# Patient Record
Sex: FEMALE | Race: WHITE | NOT HISPANIC OR LATINO | URBAN - METROPOLITAN AREA
[De-identification: names, ages, dates, MRNs, and addresses within clinical notes are randomized per-mention and may not be internally consistent; named-entity substitution may affect disease eponyms.]

---

## 2023-03-10 ENCOUNTER — APPOINTMENT (RX ONLY)
Dept: URBAN - METROPOLITAN AREA CLINIC 52 | Facility: CLINIC | Age: 58
Setting detail: DERMATOLOGY
End: 2023-03-10

## 2023-03-10 DIAGNOSIS — L71.8 OTHER ROSACEA: ICD-10-CM | Status: WORSENING

## 2023-03-10 PROCEDURE — ? PRESCRIPTION MEDICATION MANAGEMENT

## 2023-03-10 PROCEDURE — ? COUNSELING

## 2023-03-10 PROCEDURE — 99204 OFFICE O/P NEW MOD 45 MIN: CPT

## 2023-03-10 PROCEDURE — ? PRESCRIPTION

## 2023-03-10 RX ORDER — PHARMACY COMPOUNDING ACCESSORY
EACH MISCELLANEOUS
Qty: 30 | Refills: 5 | Status: ERX | COMMUNITY
Start: 2023-03-10

## 2023-03-10 RX ORDER — DOXYCYCLINE HYCLATE 100 MG/1
1 TABLET, COATED ORAL BID
Qty: 60 | Refills: 3 | Status: ERX | COMMUNITY
Start: 2023-03-10

## 2023-03-10 RX ADMIN — DOXYCYCLINE HYCLATE 1: 100 TABLET, COATED ORAL at 00:00

## 2023-03-10 RX ADMIN — Medication: at 00:00

## 2023-03-10 ASSESSMENT — LOCATION SIMPLE DESCRIPTION DERM
LOCATION SIMPLE: RIGHT CHEEK
LOCATION SIMPLE: LEFT CHEEK

## 2023-03-10 ASSESSMENT — LOCATION ZONE DERM: LOCATION ZONE: FACE

## 2023-03-10 ASSESSMENT — LOCATION DETAILED DESCRIPTION DERM
LOCATION DETAILED: LEFT MEDIAL MALAR CHEEK
LOCATION DETAILED: RIGHT CENTRAL MALAR CHEEK

## 2023-03-10 NOTE — PROCEDURE: PRESCRIPTION MEDICATION MANAGEMENT
Initiate Treatment: doxycycline hyclate 100 mg tablet Bid\\nQuantity: 60.0 Tablet  Days Supply: 30\\nSi tab PO bid with large glass of water and food.\\n\\npharmacy compounding accessory through [facility] pharmacy \\nQuantity: 30.0 g  Days Supply: 30\\nSig: Rozelic cream 60 gm \\n Metronidazole sup 1%\\nAdelaide Acid except 7.5 %\\nIvermectin sup 1% Add Nicotinamide sup 3% Initiate Treatment: doxycycline hyclate 100 mg tablet Bid\\nQuantity: 60.0 Tablet  Days Supply: 30\\nSi tab PO bid with large glass of water and food.\\n\\npharmacy compounding accessory through [facility] pharmacy \\nQuantity: 30.0 g  Days Supply: 30\\nSig: Rozelic cream 60 gm \\n Metronidazole sup 1%\\nAdelaide Acid except 7.5 %\\nIvermectin sup 1% Add Nicotinamide sup 3%

## 2023-05-18 ENCOUNTER — APPOINTMENT (RX ONLY)
Dept: URBAN - METROPOLITAN AREA CLINIC 52 | Facility: CLINIC | Age: 58
Setting detail: DERMATOLOGY
End: 2023-05-18

## 2023-05-18 DIAGNOSIS — L71.8 OTHER ROSACEA: ICD-10-CM | Status: IMPROVED

## 2023-05-18 PROCEDURE — 99213 OFFICE O/P EST LOW 20 MIN: CPT

## 2023-05-18 PROCEDURE — ? PRESCRIPTION MEDICATION MANAGEMENT

## 2023-05-18 PROCEDURE — ? COUNSELING

## 2023-05-18 ASSESSMENT — LOCATION DETAILED DESCRIPTION DERM
LOCATION DETAILED: LEFT MEDIAL MALAR CHEEK
LOCATION DETAILED: RIGHT CENTRAL MALAR CHEEK

## 2023-05-18 ASSESSMENT — LOCATION SIMPLE DESCRIPTION DERM
LOCATION SIMPLE: LEFT CHEEK
LOCATION SIMPLE: RIGHT CHEEK

## 2023-05-18 ASSESSMENT — LOCATION ZONE DERM: LOCATION ZONE: FACE

## 2023-05-18 NOTE — PROCEDURE: PRESCRIPTION MEDICATION MANAGEMENT
Continue Regimen: doxycycline hyclate 100 mg tablet Bid\\nQuantity: 60.0 Tablet  Days Supply: 30\\nSi tab PO bid with large glass of water and food.\\n\\npharmacy compounding accessory through [facility] pharmacy \\nQuantity: 30.0 g  Days Supply: 30\\nSig: Rozelic cream 60 gm \\n Metronidazole sup 1%\\nAdelaide Acid except 7.5 %\\nIvermectin sup 1% Add Nicotinamide sup 3% Continue Regimen: doxycycline hyclate 100 mg tablet Bid\\nQuantity: 60.0 Tablet  Days Supply: 30\\nSi tab PO bid with large glass of water and food.\\n\\npharmacy compounding accessory through [facility] pharmacy \\nQuantity: 30.0 g  Days Supply: 30\\nSig: Rozelic cream 60 gm \\n Metronidazole sup 1%\\nAdelaide Acid except 7.5 %\\nIvermectin sup 1% Add Nicotinamide sup 3%